# Patient Record
Sex: MALE | Race: ASIAN | NOT HISPANIC OR LATINO | URBAN - METROPOLITAN AREA
[De-identification: names, ages, dates, MRNs, and addresses within clinical notes are randomized per-mention and may not be internally consistent; named-entity substitution may affect disease eponyms.]

---

## 2023-03-29 ENCOUNTER — EMERGENCY (EMERGENCY)
Facility: HOSPITAL | Age: 53
LOS: 1 days | Discharge: HOME | End: 2023-03-29
Admitting: EMERGENCY MEDICINE
Payer: COMMERCIAL

## 2023-03-29 VITALS
RESPIRATION RATE: 15 BRPM | TEMPERATURE: 98 F | HEART RATE: 73 BPM | DIASTOLIC BLOOD PRESSURE: 62 MMHG | SYSTOLIC BLOOD PRESSURE: 150 MMHG | OXYGEN SATURATION: 100 %

## 2023-03-29 PROCEDURE — 99284 EMERGENCY DEPT VISIT MOD MDM: CPT

## 2023-03-29 RX ORDER — OXYCODONE HYDROCHLORIDE 5 MG/1
1 TABLET ORAL
Qty: 6 | Refills: 0
Start: 2023-03-29 | End: 2023-03-30

## 2023-03-29 RX ORDER — KETOROLAC TROMETHAMINE 30 MG/ML
30 SYRINGE (ML) INJECTION ONCE
Refills: 0 | Status: DISCONTINUED | OUTPATIENT
Start: 2023-03-29 | End: 2023-03-29

## 2023-03-29 RX ORDER — IBUPROFEN 200 MG
1 TABLET ORAL
Qty: 21 | Refills: 0
Start: 2023-03-29 | End: 2023-04-04

## 2023-03-29 RX ADMIN — Medication 30 MILLIGRAM(S): at 04:58

## 2023-03-29 NOTE — ED ADULT TRIAGE NOTE - CHIEF COMPLAINT QUOTE
presents C/O lower back pain radiating to B/L legs. denies trauma or injury. Pt ambulatory in triage. Pmhx HTN HLD.

## 2023-03-29 NOTE — ED PROVIDER NOTE - OBJECTIVE STATEMENT
52 M h/o HTN, HLD, chronic back pain p/w low back pain x few days. Pt reports pain radiates to b/l legs. Ambulatory. No bladder or bowel incontinence. No saddle anesthesia. Pt states he had MRI 1 year ago, results provided by pt show disc bulging throughout lumbar spine.

## 2023-03-29 NOTE — ED PROVIDER NOTE - CLINICAL SUMMARY MEDICAL DECISION MAKING FREE TEXT BOX
52 M h/o HTN, HLD, chronic back pain p/w low back pain x few days. Pt reports pain radiates to b/l legs. Ambulatory. No bladder or bowel incontinence. No saddle anesthesia. Pt states he had MRI 1 year ago, results provided by pt show disc bulging throughout lumbar spine.     acute on chronic back pain    plan  - pain control  - follow up with spine

## 2023-03-29 NOTE — ED PROVIDER NOTE - PATIENT PORTAL LINK FT
You can access the FollowMyHealth Patient Portal offered by [facility] by registering at the following website: [email or web address]. By joining [facility]’s FollowMyHealth portal, you will also be able to view your health information using other applications (apps) compatible with our system.

## 2023-03-29 NOTE — ED ADULT NURSE NOTE - OBJECTIVE STATEMENT
pt rcd to intake spot 10a a&ox4 with steady gait c/o back pain radiating to bilateral lower extremities. pt with no swelling to bilateral lower extremities. pt states "pmhx disc compression". pt denies any trauma or heavy lifting. pt abdomen soft and nondistended. pt denies chest pain, sob, nausea, vomiting, dizziness, headache, fevers/chills. pt respirations even and unlabored with no accessory muscle use. pt in NAD and resting in chair pending dispo at this time.

## 2023-06-08 NOTE — ED PROVIDER NOTE - NSPTACCESSSVCSAPPT_ED_ALL_ED
-MRI was performed February 21, 2023  -She has been going to the pain clinic  -We discussed her current therapy so far and unfortunately is not been all that effective  -She will contact the pain clinic about referral to a neurosurgeon   Specialty Care (Immediate)...